# Patient Record
Sex: MALE | Race: OTHER | Employment: UNEMPLOYED | URBAN - METROPOLITAN AREA
[De-identification: names, ages, dates, MRNs, and addresses within clinical notes are randomized per-mention and may not be internally consistent; named-entity substitution may affect disease eponyms.]

---

## 2017-08-09 ENCOUNTER — HOSPITAL ENCOUNTER (EMERGENCY)
Age: 37
Discharge: HOME | End: 2017-08-10
Attending: EMERGENCY MEDICINE
Payer: SELF-PAY

## 2017-08-09 DIAGNOSIS — R50.9 FEVER, UNSPECIFIED FEVER CAUSE: ICD-10-CM

## 2017-08-09 DIAGNOSIS — R51.9 ACUTE NONINTRACTABLE HEADACHE, UNSPECIFIED HEADACHE TYPE: Primary | ICD-10-CM

## 2017-08-09 PROCEDURE — 96374 THER/PROPH/DIAG INJ IV PUSH: CPT

## 2017-08-09 PROCEDURE — 99283 EMERGENCY DEPT VISIT LOW MDM: CPT

## 2017-08-09 PROCEDURE — 96361 HYDRATE IV INFUSION ADD-ON: CPT

## 2017-08-09 RX ORDER — AMOXICILLIN AND CLAVULANATE POTASSIUM 875; 125 MG/1; MG/1
TABLET, FILM COATED ORAL EVERY 12 HOURS
COMMUNITY

## 2017-08-10 VITALS
HEIGHT: 67 IN | TEMPERATURE: 98.9 F | RESPIRATION RATE: 18 BRPM | OXYGEN SATURATION: 97 % | SYSTOLIC BLOOD PRESSURE: 127 MMHG | DIASTOLIC BLOOD PRESSURE: 62 MMHG | WEIGHT: 155 LBS | BODY MASS INDEX: 24.33 KG/M2 | HEART RATE: 87 BPM

## 2017-08-10 LAB
ALBUMIN SERPL BCP-MCNC: 3.1 G/DL (ref 3.5–5)
ALBUMIN/GLOB SERPL: 0.6 {RATIO} (ref 1.1–2.2)
ALP SERPL-CCNC: 106 U/L (ref 45–117)
ALT SERPL-CCNC: 117 U/L (ref 12–78)
ANION GAP BLD CALC-SCNC: 10 MMOL/L (ref 5–15)
AST SERPL W P-5'-P-CCNC: 82 U/L (ref 15–37)
BASOPHILS # BLD AUTO: 0 K/UL (ref 0–0.1)
BASOPHILS # BLD: 0 % (ref 0–1)
BILIRUB SERPL-MCNC: 0.3 MG/DL (ref 0.2–1)
BUN SERPL-MCNC: 9 MG/DL (ref 6–20)
BUN/CREAT SERPL: 8 (ref 12–20)
CALCIUM SERPL-MCNC: 8.7 MG/DL (ref 8.5–10.1)
CHLORIDE SERPL-SCNC: 99 MMOL/L (ref 97–108)
CO2 SERPL-SCNC: 25 MMOL/L (ref 21–32)
CREAT SERPL-MCNC: 1.08 MG/DL (ref 0.7–1.3)
DEPRECATED S PYO AG THROAT QL EIA: NEGATIVE
EOSINOPHIL # BLD: 0 K/UL (ref 0–0.4)
EOSINOPHIL NFR BLD: 0 % (ref 0–7)
ERYTHROCYTE [DISTWIDTH] IN BLOOD BY AUTOMATED COUNT: 13.8 % (ref 11.5–14.5)
FLUAV AG NPH QL IA: NEGATIVE
FLUBV AG NOSE QL IA: NEGATIVE
GLOBULIN SER CALC-MCNC: 4.8 G/DL (ref 2–4)
GLUCOSE SERPL-MCNC: 140 MG/DL (ref 65–100)
HCT VFR BLD AUTO: 38.6 % (ref 36.6–50.3)
HGB BLD-MCNC: 13.1 G/DL (ref 12.1–17)
LYMPHOCYTES # BLD AUTO: 16 % (ref 12–49)
LYMPHOCYTES # BLD: 1.3 K/UL (ref 0.8–3.5)
MCH RBC QN AUTO: 28.5 PG (ref 26–34)
MCHC RBC AUTO-ENTMCNC: 33.9 G/DL (ref 30–36.5)
MCV RBC AUTO: 83.9 FL (ref 80–99)
MONOCYTES # BLD: 0.8 K/UL (ref 0–1)
MONOCYTES NFR BLD AUTO: 9 % (ref 5–13)
NEUTS SEG # BLD: 6.4 K/UL (ref 1.8–8)
NEUTS SEG NFR BLD AUTO: 75 % (ref 32–75)
PLATELET # BLD AUTO: 238 K/UL (ref 150–400)
POTASSIUM SERPL-SCNC: 3.7 MMOL/L (ref 3.5–5.1)
PROT SERPL-MCNC: 7.9 G/DL (ref 6.4–8.2)
RBC # BLD AUTO: 4.6 M/UL (ref 4.1–5.7)
SODIUM SERPL-SCNC: 134 MMOL/L (ref 136–145)
WBC # BLD AUTO: 8.6 K/UL (ref 4.1–11.1)

## 2017-08-10 PROCEDURE — 74011250637 HC RX REV CODE- 250/637: Performed by: EMERGENCY MEDICINE

## 2017-08-10 PROCEDURE — 36415 COLL VENOUS BLD VENIPUNCTURE: CPT | Performed by: EMERGENCY MEDICINE

## 2017-08-10 PROCEDURE — 87070 CULTURE OTHR SPECIMN AEROBIC: CPT | Performed by: EMERGENCY MEDICINE

## 2017-08-10 PROCEDURE — 80053 COMPREHEN METABOLIC PANEL: CPT | Performed by: EMERGENCY MEDICINE

## 2017-08-10 PROCEDURE — 74011250636 HC RX REV CODE- 250/636: Performed by: EMERGENCY MEDICINE

## 2017-08-10 PROCEDURE — 87880 STREP A ASSAY W/OPTIC: CPT | Performed by: EMERGENCY MEDICINE

## 2017-08-10 PROCEDURE — 87804 INFLUENZA ASSAY W/OPTIC: CPT | Performed by: EMERGENCY MEDICINE

## 2017-08-10 PROCEDURE — 85025 COMPLETE CBC W/AUTO DIFF WBC: CPT | Performed by: EMERGENCY MEDICINE

## 2017-08-10 RX ORDER — ACETAMINOPHEN 325 MG/1
975 TABLET ORAL
Status: COMPLETED | OUTPATIENT
Start: 2017-08-10 | End: 2017-08-10

## 2017-08-10 RX ORDER — KETOROLAC TROMETHAMINE 30 MG/ML
30 INJECTION, SOLUTION INTRAMUSCULAR; INTRAVENOUS
Status: COMPLETED | OUTPATIENT
Start: 2017-08-10 | End: 2017-08-10

## 2017-08-10 RX ADMIN — KETOROLAC TROMETHAMINE 30 MG: 30 INJECTION, SOLUTION INTRAMUSCULAR at 00:25

## 2017-08-10 RX ADMIN — SODIUM CHLORIDE 1000 ML: 900 INJECTION, SOLUTION INTRAVENOUS at 00:26

## 2017-08-10 RX ADMIN — ACETAMINOPHEN 975 MG: 325 TABLET ORAL at 00:48

## 2017-08-10 NOTE — ED TRIAGE NOTES
Patient has had fever, headache and generalized body aches for about 6 days.   Was put on abx on Monday but hasnt gotten better

## 2017-08-10 NOTE — ED PROVIDER NOTES
HPI Comments: 42 y.o. male with no significant past medical history who presents from home with chief complaint of fever. Pt reports 6 day hx of fever accompanied by headache and body aches. Pt describes the headache as generalized exacerbated with chin to chest. Pt states that he went to [facility] (2 days ago) for symptoms and was placed on Augmentin. Pt denies any relief from Augmentin and/or Tylenol. Pt also c/o dry cough, diaphoresis and nausea. No rash, diarrhea or insect bites. There are no other acute medical concerns at this time. PCP: None    Note written by Trinh Shelton, as dictated by Alexa Benson MD 12:03 AM    The history is provided by the patient. No  was used. History reviewed. No pertinent past medical history. History reviewed. No pertinent surgical history. History reviewed. No pertinent family history. Social History     Social History    Marital status: SINGLE     Spouse name: N/A    Number of children: N/A    Years of education: N/A     Occupational History    Not on file. Social History Main Topics    Smoking status: Current Some Day Smoker    Smokeless tobacco: Never Used    Alcohol use Not on file    Drug use: Not on file    Sexual activity: Not on file     Other Topics Concern    Not on file     Social History Narrative    No narrative on file         ALLERGIES: Review of patient's allergies indicates no known allergies. Review of Systems   Constitutional: Positive for diaphoresis and fever. Respiratory: Positive for cough (dry). Gastrointestinal: Positive for nausea. Negative for diarrhea. Musculoskeletal: Positive for myalgias. Skin: Negative for rash and wound. Neurological: Positive for headaches. All other systems reviewed and are negative.       Vitals:    08/09/17 2210 08/10/17 0046 08/10/17 0232   BP: 130/77  127/62   Pulse: 100  87   Resp: 22  18   Temp: (!) 102.9 °F (39.4 °C) (!) 102.8 °F (39.3 °C) 98.9 °F (37.2 °C)   SpO2: 96%  97%   Weight: 70.3 kg (155 lb)     Height: 5' 7\" (1.702 m)              Physical Exam   Constitutional: He appears well-developed and well-nourished. No distress. HENT:   Head: Normocephalic and atraumatic. Mouth/Throat: Oropharynx is clear and moist.   Eyes: Conjunctivae and EOM are normal.   Neck: Normal range of motion and phonation normal. Neck supple. No meningismus   Cardiovascular: Normal rate and intact distal pulses. Pulmonary/Chest: Effort normal and breath sounds normal. No respiratory distress. He has no wheezes. Abdominal: Soft. He exhibits no distension. There is no tenderness. There is no rebound. Musculoskeletal: Normal range of motion. He exhibits no tenderness. Neurological: He is alert. He is not disoriented. He exhibits normal muscle tone. Skin: Skin is warm and dry. Nursing note and vitals reviewed. [facility]  ED Course     2:19 AM  Patient reassessed and is feeling better after toradol and IVNS. LAbs reveiwed. CBC normal.  CMP with mild elevation of ast and ALT. Suspect viral illness. Flu and strep swabs negative. Will discharge home.    Procedures

## 2017-08-10 NOTE — DISCHARGE INSTRUCTIONS
Dolor de ash: Instrucciones de cuidado - [ Headache: Care Instructions ]  Instrucciones de cuidado    Los christiano de ash tienen muchas causas posibles. La mayoría de los christiano de ash no son señal de un problema más elissa y mejoran por sí solos. El tratamiento en el hogar podría ayudarlo a sentirse mejor con [place]. El médico lo maria revisado minuciosamente, jelly puede desarrollar problemas más tarde. Si nota algún problema o síntomas, busque tratamiento médico inmediatamente. La atención de seguimiento es leo parte clave de butterfield tratamiento y seguridad. Asegúrese de hacer y acudir a todas las citas, y llame a butterfield médico si está teniendo problemas. También es leo buena idea saber los resultados de los exámenes y mantener leo lista de los medicamentos que albino. ¿Cómo puede cuidarse en el hogar? · No conduzca si ha tomado analgésicos (medicamentos para el dolor) recetados. · Descanse en un cuarto tranquilo y oscuro hasta que desaparezca el dolor de [place]. Cierre los ojos y trate de relajarse o dormirse. No balbir la televisión ni matthew. · Colóquese un paño frío y húmedo o [facility] compresa fría sobre la miguel adolorida de 10 a 21 minutos cada vez. Póngase un paño leal entre la compresa fría y la piel. · Utilice leo toalla húmeda tibia o leo almohadilla térmica ajustada a baja temperatura para relajar los músculos tensos del tori y los hombros.  · Pídale a alguien que le gaby masajes suaves en el tori y los hombros.  · [place] los analgésicos exactamente obie le fueron indicados. ¨ Si el médico le recetó un analgésico, tómelo según las indicaciones. ¨ Si no está tomando un analgésico recetado, pregúntele a butterfield médico si puede melody daisy de [employer]. · Tenga cuidado de no melody analgésicos con mayor frecuencia que la permitida en las indicaciones porque los christiano de ash podrían empeorar o aparecer con mayor frecuencia leo vez que el medicamento pierda butterfield [place].   · Preste atención a los nuevos síntomas que aparecen con el dolor de [place], [place], debilidad o entumecimiento, cambios en la visión o confusión. Podrían ser señales de un problema más grave. Para prevenir los christiano de ash  · [employer] un diario de tyson christiano de ash para que pueda averiguar qué los desencadena. Evitar los desencadenantes podría ayudar a prevenir los christiano de [place]. Anote cuándo empieza cada dolor de [place], cuánto dura y cómo es el dolor (palpitante, latesha, punzante o sordo). Anote cualquier otro síntoma que haya tenido con el dolor de [place], [place] náuseas, destellos de abrahan o INGRID, o sensibilidad a la abrahan brillante o a los ruidos sergio. Anote si el dolor de ash ocurrió cerca de butterfield menstruación. Enumere todos los factores que pudieran cecelia desencadenado el dolor de [place], obie ciertos alimentos (chocolate, queso, vino) u olores, humo, luces brillantes, estrés o falta de sueño. · Encuentre maneras saludables de [facility]. Los christiano de [place] son más comunes ross o luci después de un momento estresante. Tómese un tiempo para relajarse antes y después de hacer algo que le haya causado un dolor de ash en el pasado. · Trate de mantener tyson músculos relajados mediante leo buena postura. Revise si tiene [employer] de la [place], la luis, el tori y los hombros y trate de relajarlos. Cuando se siente en un escritorio, cambie de posición con frecuencia y estírese por 27 segundos cada hora. · Romain suficiente ejercicio y duerma bastante. · Coma en forma regular y ramesh. Largos períodos sin comer pueden provocar un dolor de ash. · Regálese un masaje. Algunas personas encuentran que los masajes hechos con regularidad son Janeann Lowing para aliviar la tensión. · Limite la cafeína. No gail demasiado café, té ni sodas. Raj no deje de consumir cafeína de repente, porque eso también puede provocarle christiano de [place].   · Reduzca la tensión en los ojos a causa de la computadora parpadeando con frecuencia y apartando la mirada de la pantalla a menudo. Asegúrese de tener lentes adecuados y de que butterfield monitor esté colocado de manera correcta, obie a un brazo de distancia. · Busque ayuda si tiene depresión o ansiedad. Sarah christiano de [place] podrían relacionarse con estas afecciones. El tratamiento puede prevenir los christiano de [place] y ayudar con los síntomas de ansiedad o depresión. ¿Cuándo debe pedir ayuda? Llame al 911 en cualquier momento que piense que puede necesitar atención de emergencia. Por ejemplo, llame si:  · Tiene señales de un ataque cerebral. Estas pueden incluir:  ¨ Parálisis, entumecimiento o debilidad repentinos en la luis, el brazo o la pierna, sobre todo si ocurre en un solo lado del cuerpo. ¨ Cambios repentinos en la visión. ¨ Dificultades repentinas para hablar. ¨ Confusión repentina o dificultad para comprender frases sencillas. ¨ Problemas repentinos para caminar o mantener el equilibrio. ¨ Dolor de [place] intenso y repentino, distinto de los christiano de ash anteriores. Llame a butterfield médico ahora mismo o busque atención médica inmediata si:  · Tiene un dolor de ash nuevo o peor. · Butterfield dolor de ash [employer]. ¿Dónde puede encontrar más información en inglés? Omi narvaez http://saúl-abraham.info/. Escriba N909 en la búsqueda para aprender más acerca de \"Dolor de ash: Instrucciones de cuidado - [ Headache: Care Instructions ]. \"  Revisado: 14 octubre, 2016  Versión del contenido: 11.3  © 9048-8909 Healthwise, Incorporated. Las instrucciones de cuidado fueron adaptadas bajo licencia por [employer] (which disclaims liability or warranty for this information). Si usted tiene [place] afección médica o sobre estas instrucciones, siempre pregunte a butterfield profesional de cait. Healthwise, Incorporated niega toda garantía o responsabilidad por butterfield uso de esta información.          Aprenda sobre la fiebre - [ Learning About Fever ]  ¿Qué es la fiebre? La fiebre es leo temperatura corporal lucia. Es leo de las [employer] que el cuerpo combate enfermedades. La fiebre indica que el cuerpo está reaccionando a leo infección o a otras enfermedades, tanto leves obie graves. La fiebre es un síntoma, no leo enfermedad en sí misma. La fiebre puede ser leo señal de que usted está enfermo, raj la mayoría de las fiebres no están causadas por un problema grave. Es posible que usted tenga fiebre con leo enfermedad de ab importancia, obie un resfriado. Raj, en ocasiones, leo infección muy grave puede causar poca o nada de fiebre. Es importante considerar otros síntomas, otras afecciones que usted tenga y cómo se siente usted en general. En niños, preste atención a butterfield comportamiento y a los síntomas de los que se [place]. ¿Cuál es la temperatura normal del cuerpo? Leo temperatura corporal normal es de 98.6°F (37°C), aproximadamente. Algunas personas tienen leo temperatura normal que es un poco más lucia o algo más baja que esta. Butterfield temperatura puede ser un poco más baja en la mañana que más tarde en el día. Podría elevarse cuando hace ejercicio, lleva ropa gruesa, albino un baño caliente o cuando hace calor. Butterfield temperatura también puede ser diferente dependiendo de cómo la mida. Si mide la temperatura en la boca (oral) o en la axila, puede ser algo más baja que la temperatura central (rectal). ¿Qué es leo temperatura de fiebre? Leo temperatura central de 100.4°F (38°C) o superior se considera fiebre. ¿Qué puede causar la fiebre? La fiebre puede ser causada por:  · Infecciones. Esta es la causa más común de la [place]. Los ejemplos de infecciones que pueden provocar fiebre incluyen la gripe, leo infección de los riñones o la neumonía. · Algunos medicamentos. · Traumatismos o lesiones graves, obie un ataque al corazón, un ataque cerebral, insolación o quemaduras.   · Otras afecciones médicas, obie artritis y algunos tipos de cáncer. ¿Cómo puede tratar la fiebre en el hogar? · Pregúntele a butterfield médico si puede melody un analgésico (medicamento para el dolor) de venta taylor, obie acetaminofén (Tylenol), ibuprofeno (Advil, Motrin) o naproxeno (Aleve). Sea ruby con los medicamentos. Mercedes y siga todas las instrucciones de la [place]. · Yudith abundantes líquidos para prevenir la deshidratación. Opte por beber agua y otros líquidos jordan sin cafeína hasta que se sienta mejor. Si tiene leo enfermedad renal, cardíaca o hepática y tiene que restringir los líquidos, hable con butterfield médico antes de aumentar la cantidad de líquido que sander. La atención de seguimiento es leo parte clave de butterfield tratamiento y seguridad. Asegúrese de hacer y acudir a todas las citas, y llame a butterfield médico si está teniendo problemas. También es leo buena idea saber los resultados de tyson exámenes y mantener leo lista de los medicamentos que albino. ¿Dónde puede encontrar más información en inglés? Omi Duckworth a http://saúl-abraham.info/. Janelle Peña D637 en la búsqueda para aprender más acerca de \"Aprenda sobre la Link [place] - [ Learning About Fever ]. \"  Revisado: 20 marzo, 2017  Versión del contenido: 11.3  © 2674-1811 Healthwise, Incorporated. Las instrucciones de cuidado fueron adaptadas bajo licencia por [employer] (which disclaims liability or warranty for this information). Si usted tiene [place] afección médica o sobre estas instrucciones, siempre pregunte a butterfield profesional de cait. Healthwise, Incorporated niega toda garantía o responsabilidad por butterfield uso de esta información. We hope that we have addressed all of your medical concerns. The examination and treatment you received in the Emergency Department were for an emergent problem and were not intended as complete care. It is important that you follow up with your healthcare provider(s) for ongoing care.  If your symptoms worsen or do not improve as expected, and you are unable to reach your usual health care provider(s), you should return to the Emergency Department. Today's healthcare is undergoing tremendous change, and patient satisfaction surveys are one of the many tools to assess the quality of medical care. You may receive a survey from the [facility] regarding your experience in the Emergency Department. I hope that your experience has been completely positive, particularly the medical care that I provided. As such, please participate in the survey; anything less than excellent does not meet my expectations or intentions. [facility] and [facility] participate in nationally recognized quality of care measures. If your blood pressure is greater than 120/80, as reported below, we urge that you seek medical care to address the potential of high blood pressure, commonly known as hypertension. Hypertension can be hereditary or can be caused by certain medical conditions, pain, stress, or \"white coat syndrome. \"       Please make an appointment with your health care provider(s) for follow up of your Emergency Department visit. VITALS:   Patient Vitals for the past 8 hrs:   Temp Pulse Resp BP SpO2   08/10/17 0046 (!) 102.8 °F (39.3 °C) - - - -   08/09/17 2210 (!) 102.9 °F (39.4 °C) 100 22 130/77 96 %          Thank you for allowing us to provide you with medical care today. We realize that you have many choices for your emergency care needs. Please choose us in the future for any continued health care needs. Marija Minor, [address] [place] [address] 20.   Office: [phone]            Recent Results (from the past 24 hour(s))   CBC WITH AUTOMATED DIFF    Collection Time: 08/10/17 12:30 AM   Result Value Ref Range    WBC 8.6 4.1 - 11.1 K/uL    RBC 4.60 4.10 - 5.70 M/uL    HGB 13.1 12.1 - 17.0 g/dL    HCT 38.6 36.6 - 50.3 %    MCV 83.9 80.0 - 99.0 FL    MCH 28.5 26.0 - 34.0 PG    MCHC 33.9 30.0 - 36.5 g/dL    RDW 13.8 11.5 - 14.5 %    PLATELET 304 401 - 585 K/uL    NEUTROPHILS 75 32 - 75 %    LYMPHOCYTES 16 12 - 49 %    MONOCYTES 9 5 - 13 %    EOSINOPHILS 0 0 - 7 %    BASOPHILS 0 0 - 1 %    ABS. NEUTROPHILS 6.4 1.8 - 8.0 K/UL    ABS. LYMPHOCYTES 1.3 0.8 - 3.5 K/UL    ABS. MONOCYTES 0.8 0.0 - 1.0 K/UL    ABS. EOSINOPHILS 0.0 0.0 - 0.4 K/UL    ABS. BASOPHILS 0.0 0.0 - 0.1 K/UL   METABOLIC PANEL, COMPREHENSIVE    Collection Time: 08/10/17 12:30 AM   Result Value Ref Range    Sodium 134 (L) 136 - 145 mmol/L    Potassium 3.7 3.5 - 5.1 mmol/L    Chloride 99 97 - 108 mmol/L    CO2 25 21 - 32 mmol/L    Anion gap 10 5 - 15 mmol/L    Glucose 140 (H) 65 - 100 mg/dL    BUN 9 6 - 20 MG/DL    Creatinine 1.08 0.70 - 1.30 MG/DL    BUN/Creatinine ratio 8 (L) 12 - 20      GFR est AA >60 >60 ml/min/1.73m2    GFR est non-AA >60 >60 ml/min/1.73m2    Calcium 8.7 8.5 - 10.1 MG/DL    Bilirubin, total 0.3 0.2 - 1.0 MG/DL    ALT (SGPT) 117 (H) 12 - 78 U/L    AST (SGOT) 82 (H) 15 - 37 U/L    Alk. phosphatase 106 45 - 117 U/L    Protein, total 7.9 6.4 - 8.2 g/dL    Albumin 3.1 (L) 3.5 - 5.0 g/dL    Globulin 4.8 (H) 2.0 - 4.0 g/dL    A-G Ratio 0.6 (L) 1.1 - 2.2     INFLUENZA A & B AG (RAPID TEST)    Collection Time: 08/10/17 12:30 AM   Result Value Ref Range    Influenza A Antigen NEGATIVE  NEG      Influenza B Antigen NEGATIVE  NEG     STREP AG SCREEN, GROUP A    Collection Time: 08/10/17 12:30 AM   Result Value Ref Range    Group A Strep Ag ID NEGATIVE  NEG         No results found.

## 2017-08-10 NOTE — ED NOTES
Bedside shift change report given to Corky (oncoming nurse) by Cr Bush RN (offgoing nurse). Report included the following information SBAR, Kardex, ED Summary, Procedure Summary, Intake/Output, MAR and Recent Results.

## 2017-08-12 LAB
BACTERIA SPEC CULT: NORMAL
SERVICE CMNT-IMP: NORMAL